# Patient Record
Sex: FEMALE | Race: WHITE
[De-identification: names, ages, dates, MRNs, and addresses within clinical notes are randomized per-mention and may not be internally consistent; named-entity substitution may affect disease eponyms.]

---

## 2018-05-01 ENCOUNTER — HOSPITAL ENCOUNTER (OUTPATIENT)
Dept: HOSPITAL 47 - RADBDWWP | Age: 73
Discharge: HOME | End: 2018-05-01
Payer: MEDICARE

## 2018-05-01 DIAGNOSIS — M85.851: Primary | ICD-10-CM

## 2018-05-01 PROCEDURE — 77080 DXA BONE DENSITY AXIAL: CPT

## 2018-05-01 NOTE — BD
EXAMINATION TYPE: MG DEXA axial skeleton.  

 

DATE OF EXAM: 5/1/2018

 

COMPARISON: DEXA bone scan June 13, 2014

 

CLINICAL HISTORY: osteoporosis

 

Height:  5'5

Weight:  183

 

FRAX RISK QUESTIONS:

Alcohol (3 or more units per day):  yes

Family History (Parent hip fracture):  no

Glucocorticoids (More than 3mos):  no

           (Ex: prednisone, prednisolone, methylprednisolone, dexamethasone, and hydrocortisone).    
     

History of Fracture in Adulthood: yes

Secondary Osteoporosis:

  1.  Type 1 Diabetes: no

  2.  Hyperthyroidism: no

  3.  Menopause before 45: yes

  4.  Malnutrition: no

  5.  Chronic liver disease: no

Rheumatoid Arthritis: no

Current Tobacco Use: no

 

RISK FACTORS 

HISTORY OF: 

Family History of Osteoporosis: y

Active: n

Postmenopausal woman:

 

MEDICATIONS: 

Additional Medications: asthma, copd, antidepressant 

 

 

Additional History: breast cancer 1980,chemotherapy

 

 

EXAM MEASUREMENTS: 

Bone mineral densitometry was performed using the [employer].

Bone mineral density as measured about the Lumbar spine is:  

----- L1-L4(G/cm2): -1.088

T Score Values are as follows:

----- L2: -1.2

----- L3: -0.3

----- L4: -1.5

----- L1-L4: -0.8

Bone mineral density has: Decreased -0.8% since study of: June 13, 2014

 

Bone mineral density about the R hip (g/cm2): 0.892

Bone mineral density about the L hip (g/cm2): 0.928

T Score values are as follows:

-----R Neck: -1.1

-----L Neck: -0.8

-----R Total: -0.8

-----L Total: -0.7

Bone mineral density has: Increased 0.8% since study of: 06/13/2014

 

 

IMPRESSION:

Osteopenia (T Score between -2.5 and -1) at femoral neck level right hip is present on current study.


 

There is slightly increased risk of fracture and the patient may be considered 

for treatment. 

 

Re-Screen 2-5 years.

 

 

 

 

 

NOTE:  T-SCORE=SD OF THE YOUNG ADULT MEAN.

## 2020-07-30 ENCOUNTER — HOSPITAL ENCOUNTER (OUTPATIENT)
Dept: HOSPITAL 47 - RADCTMAIN | Age: 75
Discharge: HOME | End: 2020-07-30
Attending: INTERNAL MEDICINE
Payer: MEDICARE

## 2020-07-30 DIAGNOSIS — K42.9: Primary | ICD-10-CM

## 2020-07-30 DIAGNOSIS — K57.90: ICD-10-CM

## 2020-07-30 DIAGNOSIS — Z90.49: ICD-10-CM

## 2020-07-30 LAB — BUN SERPL-SCNC: 22 MG/DL (ref 7–17)

## 2020-07-30 PROCEDURE — 74160 CT ABDOMEN W/CONTRAST: CPT

## 2020-07-30 PROCEDURE — 84520 ASSAY OF UREA NITROGEN: CPT

## 2020-07-30 PROCEDURE — 82565 ASSAY OF CREATININE: CPT

## 2020-07-30 PROCEDURE — 36415 COLL VENOUS BLD VENIPUNCTURE: CPT

## 2020-07-30 NOTE — CT
EXAMINATION TYPE: CT abdomen w con

 

DATE OF EXAM: 7/30/2020

 

COMPARISON: None

 

HISTORY: Abdominal pain

 

CT DLP: 1081.4 mGycm

Automated exposure control for dose reduction was used.

 

TECHNIQUE:  Helical acquisition of images was performed from the lung bases through the top of iliac 
crest to include entire abdomen.

 

CONTRAST: 

Performed with Oral Contrast and with IV Contrast, patient injected with 80 mL of Isovue 300.

 

FINDINGS: 

 

LUNG BASES: Subsegmental changes involving the lungs most typical of atelectasis. Heart size prominen
t.

 

LIVER/GB: Postcholecystectomy changes noted. Hypodensity near the gallbladder fossa is too small to c
haracterize. Additional too small to characterize hypodensity in the posterior segment right lobe the
 liver. Most likely related to a tiny cyst

 

PANCREAS: No significant abnormality is seen.

 

SPLEEN: No significant abnormality is seen.

 

ADRENALS: No significant abnormality is seen.

 

KIDNEYS: Hypodensity within the anterior mid right renal cortex is too small to characterize but stat
istically most likely related to simple cyst..

 

BOWEL:  Bowel gas pattern nonspecific. Changes of diverticulosis noted. No diagnostic evidence of obs
truction as visualized.

 

LYMPH NODES:  No significant abnormality is seen.

 

OSSEOUS STRUCTURES:  Hypertrophic and degenerative change of the spine.

 

FREE AIR:  No free air is visualized.

 

OTHER: Fat-containing periumbilical hernia. Aorta of normal caliber with atherosclerotic changes.

 

 

 

IMPRESSION:

1. There is a small fat-containing periumbilical hernia.

2. Postcholecystectomy changes.

3. Indeterminate right lobe hepatic lesion too small to characterize.

4. Diverticulosis.